# Patient Record
Sex: FEMALE
[De-identification: names, ages, dates, MRNs, and addresses within clinical notes are randomized per-mention and may not be internally consistent; named-entity substitution may affect disease eponyms.]

---

## 2023-12-05 ENCOUNTER — NON-APPOINTMENT (OUTPATIENT)
Age: 48
End: 2023-12-05

## 2023-12-05 PROBLEM — Z00.00 ENCOUNTER FOR PREVENTIVE HEALTH EXAMINATION: Status: ACTIVE | Noted: 2023-12-05

## 2024-01-03 ENCOUNTER — APPOINTMENT (OUTPATIENT)
Dept: BREAST CENTER | Facility: CLINIC | Age: 49
End: 2024-01-03
Payer: MEDICAID

## 2024-01-03 VITALS
HEART RATE: 61 BPM | SYSTOLIC BLOOD PRESSURE: 98 MMHG | OXYGEN SATURATION: 99 % | DIASTOLIC BLOOD PRESSURE: 66 MMHG | BODY MASS INDEX: 21.53 KG/M2 | HEIGHT: 62 IN | WEIGHT: 117 LBS

## 2024-01-03 DIAGNOSIS — Z86.39 PERSONAL HISTORY OF OTHER ENDOCRINE, NUTRITIONAL AND METABOLIC DISEASE: ICD-10-CM

## 2024-01-03 DIAGNOSIS — Z80.42 FAMILY HISTORY OF MALIGNANT NEOPLASM OF PROSTATE: ICD-10-CM

## 2024-01-03 DIAGNOSIS — Z78.9 OTHER SPECIFIED HEALTH STATUS: ICD-10-CM

## 2024-01-03 DIAGNOSIS — Z80.0 FAMILY HISTORY OF MALIGNANT NEOPLASM OF DIGESTIVE ORGANS: ICD-10-CM

## 2024-01-03 PROCEDURE — 99204 OFFICE O/P NEW MOD 45 MIN: CPT

## 2024-01-03 RX ORDER — LEVOTHYROXINE SODIUM 0.09 MG/1
88 TABLET ORAL
Refills: 0 | Status: ACTIVE | COMMUNITY

## 2024-01-06 NOTE — FAMILY HISTORY
[TextEntry] : Paternal Aunt: Breast CA age unknown Maternal Aunt: Pancreatic CA age 70 Father: Prostate CA age 72

## 2024-01-06 NOTE — CONSULT LETTER
[Dear  ___] : Dear ~RONNIE, [Consult Letter:] : I had the pleasure of evaluating your patient, [unfilled]. [Please see my note below.] : Please see my note below. [Consult Closing:] : Thank you very much for allowing me to participate in the care of this patient.  If you have any questions, please do not hesitate to contact me. [Sincerely,] : Sincerely, [FreeTextEntry2] : Dr. Breanna Mann  [FreeTextEntry3] : Orville Braxton MD Chief of Breast Surgery Director of Breast Cancer Program [facility]

## 2024-01-06 NOTE — HISTORY OF PRESENT ILLNESS
[FreeTextEntry1] : 48 year old female was referred by Dr. Breanna Mann who presents for initial evaluation regarding intermittent inferior right breast pain since 2022, not related to menstrual cycle, associated with a palpable lump which prompted imaging on 06/28/22, B/L DX MMG/US, BI-RADS 1 with no mammo/US correlate to reported area of R breast pain. Her most recent imaging, a B/L sMMG/US, was performed on 6/12/23, again BI-RADS 1 with no correlation to the patients reported pain/lump. Patient denies nipple discharge or skin changes. Denies prior breast surgeries or biopsies.   Patient reports family history of breast cancer in paternal aunt, age unknown. Denies famhx of ovarian cancer. Famhx of pancreatic cancer in [place]unt age 70 and large cell neuroendocrine cancer in father age 72.    Mandi Lifetime Risk 20.7%

## 2024-01-06 NOTE — PAST MEDICAL HISTORY
[Menstruating] : The patient is menstruating [Menarche Age ____] : age at menarche was [unfilled] [Definite ___ (Date)] : the last menstrual period was [unfilled] [Regular Cycle Intervals] : have been regular [Total Preg ___] : G[unfilled] [Live Births ___] : P[unfilled]  [Age At Live Birth ___] : Age at live birth: [unfilled] [History of Hormone Replacement Treatment] : has no history of hormone replacement treatment [FreeTextEntry6] : No [FreeTextEntry7] : No [FreeTextEntry8] : Yes

## 2024-01-06 NOTE — PHYSICAL EXAM
[Supple] : supple [No Supraclavicular Adenopathy] : no supraclavicular adenopathy [Examined in the supine and seated position] : examined in the supine and seated position [Symmetrical] : symmetrical [No dominant masses] : no dominant masses in right breast  [No dominant masses] : no dominant masses left breast [No Nipple Retraction] : no left nipple retraction [No Nipple Discharge] : no left nipple discharge [No Axillary Lymphadenopathy] : no left axillary lymphadenopathy [Breast Nipple Inversion] : nipples not inverted

## 2024-01-06 NOTE — PROCEDURE
[FreeTextEntry1] : R breast US [FreeTextEntry2] : R breast pain  [FreeTextEntry3] : Technique: a targeted right breast ultrasound was performed with evaluation of the inner quadrant US Findings: right breast prominent ducts at 3:00 subareolar region was detected No suspicious solid or complex cystic masses were detected

## 2024-01-06 NOTE — ASSESSMENT
[FreeTextEntry1] : 48 year old female here for initial evaluation of Right breast pain for 1 year. At the time of original occurance, patient underwent B/L DX MMG/US, 6/2022, BI-RADS 1 with no mammographic or ultrasonographic correlate to reported R breast pain. Her most recent B/L sMMG/US (6/12/23) again BI-RADS 1 with no correlation to the reported pain.   Discussed with the patient that mastalgia is not a common sign of breast cancer. I recommended she use evening primrose oil - high dose EPO handout was provided and reviewed with patient.   MICAH 20.7%%.R bedside US at area of concern, R 3:00 subareolar, revealed no suspicious findings. Physical exam WNL. Most recent imaging reviewed, 6/12/23, BIRADS-1. Plan for B/L sMMG/US and re-examination in June 2024. Patient verbalizes understanding and is in agreement with the plan.
no fever and no chills.

## 2024-01-06 NOTE — DATA REVIEWED
[FreeTextEntry1] : 06/28/2022 ([facility]) B/L DX MMG/US: No mammographic or sonographic findings suggestive of malignancy. No US correlate to reported R breast pain. BI-RADS 1, Negative.   06/12/2023 ([facility]) B/L sMMG/US: Extremely dense. No mammographic of ultrasonographic evidence of malignancy. BI-RADS 1, negative.

## 2024-06-21 ENCOUNTER — NON-APPOINTMENT (OUTPATIENT)
Age: 49
End: 2024-06-21

## 2024-06-21 PROBLEM — Z80.3 FAMILY HISTORY OF MALIGNANT NEOPLASM OF BREAST: Status: ACTIVE | Noted: 2024-01-03

## 2024-06-21 PROBLEM — N64.4 MASTALGIA: Status: ACTIVE | Noted: 2023-12-28

## 2024-06-21 PROBLEM — R92.30 DENSE BREAST TISSUE: Status: ACTIVE | Noted: 2024-01-03

## 2024-06-21 PROBLEM — Z12.39 BREAST CANCER SCREENING: Status: ACTIVE | Noted: 2024-06-21

## 2024-06-26 ENCOUNTER — APPOINTMENT (OUTPATIENT)
Dept: BREAST CENTER | Facility: CLINIC | Age: 49
End: 2024-06-26
Payer: MEDICAID

## 2024-06-26 VITALS
BODY MASS INDEX: 21.35 KG/M2 | HEART RATE: 63 BPM | HEIGHT: 62 IN | DIASTOLIC BLOOD PRESSURE: 54 MMHG | SYSTOLIC BLOOD PRESSURE: 90 MMHG | WEIGHT: 116 LBS

## 2024-06-26 DIAGNOSIS — Z80.3 FAMILY HISTORY OF MALIGNANT NEOPLASM OF BREAST: ICD-10-CM

## 2024-06-26 DIAGNOSIS — N64.4 MASTODYNIA: ICD-10-CM

## 2024-06-26 DIAGNOSIS — R92.30 DENSE BREASTS, UNSPECIFIED: ICD-10-CM

## 2024-06-26 DIAGNOSIS — Z12.39 ENCOUNTER FOR OTHER SCREENING FOR MALIGNANT NEOPLASM OF BREAST: ICD-10-CM

## 2024-06-26 PROCEDURE — 99213 OFFICE O/P EST LOW 20 MIN: CPT

## 2024-06-27 NOTE — HISTORY OF PRESENT ILLNESS
[FreeTextEntry1] : 48 year old female, patient of Dr. Breanna Mann, presents for follow up evaluation regarding intermittent inferior right breast pain since 2022, not related to menstrual cycle, associated with a palpable lump which prompted imaging on 06/28/22, B/L DX MMG/US, BI-RADS 1 with no mammo/US correlate to reported area of R breast pain. Subsequent annual B/L sMMG/US (6/12/23), again BI-RADS 1 with no correlation to the patients reported pain/lump. Patient denies nipple discharge or skin changes. Denies prior breast surgeries or biopsies.   Most recent imaging completed; B/L sMMG/US 6/18/24, BIRADS-1.  Patient reports family history of breast cancer in paternal aunt, age unknown. Denies famhx of ovarian cancer. Famhx of pancreatic cancer in [place] age 70 and large cell neuroendocrine cancer in father age 72.    Mandi Lifetime Risk 20.7%

## 2024-06-27 NOTE — ASSESSMENT
[FreeTextEntry1] : 48 year old female here for follow up evaluation of Right breast pain since 2022. At the time of original occurrence, patient underwent B/L DX MMG/US (6/2022) BI-RADS 1 with no mammographic or ultrasonographic correlate to reported R breast pain. Subsequent B/L sMMG/US (6/12/23) again BI-RADS 1 with no correlation to the reported pain. R bedside US performed at [facility] (1/3/24) targeting the area of patient's concern, R 3:00 subareolar, revealed no suspicious findings.   Again, provided reassurance regarding patients intermittent long-term breast pain. Discussed with the patient that mastalgia is not a common sign of breast cancer. I recommended she continue use of evening primrose oil as needed. All patients questions were answered to their satisfaction.  MICAH 20.7%%. Physical exam WNL. Most recent imaging reviewed, B/L sMMG/US 6/18/24, BIRADS-1. Plan for B/L sMMG/US and re-examination in June 2025. Patient verbalizes understanding and is in agreement with the plan.

## 2024-06-27 NOTE — DATA REVIEWED
[FreeTextEntry1] : 06/28/22 ([facility]) B/L DX MMG/US: No mammographic or sonographic findings suggestive of malignancy. No US correlate to reported R breast pain. BI-RADS 1, Negative.   06/12/23 (R) B/L sMMG/US: Extremely dense. No mammographic of ultrasonographic evidence of malignancy. BI-RADS 1, negative.  06/18/24 (R) B/L sMMG/US: heterogeneously dense. No mammographic or sonographic evidence of malignancy. Follow Up: Annual screening. BI-RADS 1, Negative.

## 2025-06-18 ENCOUNTER — APPOINTMENT (OUTPATIENT)
Dept: BREAST CENTER | Facility: CLINIC | Age: 50
End: 2025-06-18

## 2025-08-19 ENCOUNTER — APPOINTMENT (OUTPATIENT)
Dept: BREAST CENTER | Facility: CLINIC | Age: 50
End: 2025-08-19

## 2025-08-19 DIAGNOSIS — Z80.3 FAMILY HISTORY OF MALIGNANT NEOPLASM OF BREAST: ICD-10-CM

## 2025-08-19 DIAGNOSIS — R92.30 DENSE BREASTS, UNSPECIFIED: ICD-10-CM

## 2025-08-19 DIAGNOSIS — Z12.39 ENCOUNTER FOR OTHER SCREENING FOR MALIGNANT NEOPLASM OF BREAST: ICD-10-CM

## 2025-08-19 DIAGNOSIS — N64.4 MASTODYNIA: ICD-10-CM
